# Patient Record
Sex: FEMALE | Race: BLACK OR AFRICAN AMERICAN | NOT HISPANIC OR LATINO | URBAN - METROPOLITAN AREA
[De-identification: names, ages, dates, MRNs, and addresses within clinical notes are randomized per-mention and may not be internally consistent; named-entity substitution may affect disease eponyms.]

---

## 2018-06-03 ENCOUNTER — HOSPITAL ENCOUNTER (EMERGENCY)
Facility: OTHER | Age: 43
Discharge: HOME | End: 2018-06-03
Attending: EMERGENCY MEDICINE

## 2018-06-03 VITALS
TEMPERATURE: 98 F | RESPIRATION RATE: 16 BRPM | HEIGHT: 64 IN | DIASTOLIC BLOOD PRESSURE: 68 MMHG | SYSTOLIC BLOOD PRESSURE: 119 MMHG | OXYGEN SATURATION: 97 % | WEIGHT: 150 LBS | BODY MASS INDEX: 25.61 KG/M2 | HEART RATE: 75 BPM
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
## 2018-06-03 DIAGNOSIS — S40.022A ARM CONTUSION, LEFT, INITIAL ENCOUNTER: Primary | ICD-10-CM

## 2018-06-03 PROCEDURE — 99283 EMERGENCY DEPT VISIT LOW MDM: CPT

## 2018-06-03 RX ORDER — IBUPROFEN 600 MG/1
600 TABLET ORAL EVERY 6 HOURS PRN
Qty: 20 TABLET | Refills: 0 | Status: SHIPPED | OUTPATIENT
Start: 2018-06-03
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
## 2018-06-03 NOTE — ED NOTES
Searched for pt multiple times throughout ED. Unable to locate pt. Provider. EDMOND Munugia, PAC, aware.
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
## 2020-08-13 ENCOUNTER — LAB VISIT (OUTPATIENT)
Dept: LAB | Facility: OTHER | Age: 45
End: 2020-08-13
Payer: MEDICAID

## 2020-08-13 DIAGNOSIS — Z03.818 ENCOUNTER FOR OBSERVATION FOR SUSPECTED EXPOSURE TO OTHER BIOLOGICAL AGENTS RULED OUT: ICD-10-CM

## 2020-08-13 PROCEDURE — U0003 INFECTIOUS AGENT DETECTION BY NUCLEIC ACID (DNA OR RNA); SEVERE ACUTE RESPIRATORY SYNDROME CORONAVIRUS 2 (SARS-COV-2) (CORONAVIRUS DISEASE [COVID-19]), AMPLIFIED PROBE TECHNIQUE, MAKING USE OF HIGH THROUGHPUT TECHNOLOGIES AS DESCRIBED BY CMS-2020-01-R: HCPCS

## 2020-08-16 LAB — SARS-COV-2 RNA RESP QL NAA+PROBE: NOT DETECTED

## 2024-09-12 ENCOUNTER — HOSPITAL ENCOUNTER (EMERGENCY)
Facility: OTHER | Age: 49
Discharge: HOME | End: 2024-09-12
Attending: EMERGENCY MEDICINE
Payer: MEDICAID

## 2024-09-12 VITALS
HEART RATE: 100 BPM | BODY MASS INDEX: 26.8 KG/M2 | SYSTOLIC BLOOD PRESSURE: 126 MMHG | HEIGHT: 64 IN | RESPIRATION RATE: 18 BRPM | OXYGEN SATURATION: 95 % | DIASTOLIC BLOOD PRESSURE: 74 MMHG | TEMPERATURE: 100 F | WEIGHT: 157 LBS

## 2024-09-12 DIAGNOSIS — L03.317 CELLULITIS OF BUTTOCK, RIGHT: Primary | ICD-10-CM

## 2024-09-12 PROCEDURE — 63600175 PHARM REV CODE 636 W HCPCS: Performed by: EMERGENCY MEDICINE

## 2024-09-12 PROCEDURE — 96372 THER/PROPH/DIAG INJ SC/IM: CPT | Performed by: EMERGENCY MEDICINE

## 2024-09-12 PROCEDURE — 99284 EMERGENCY DEPT VISIT MOD MDM: CPT | Mod: 25

## 2024-09-12 RX ORDER — KETOROLAC TROMETHAMINE 30 MG/ML
30 INJECTION, SOLUTION INTRAMUSCULAR; INTRAVENOUS
Status: COMPLETED | OUTPATIENT
Start: 2024-09-12 | End: 2024-09-12

## 2024-09-12 RX ORDER — SULFAMETHOXAZOLE AND TRIMETHOPRIM 800; 160 MG/1; MG/1
1 TABLET ORAL 2 TIMES DAILY
Qty: 14 TABLET | Refills: 0 | Status: SHIPPED | OUTPATIENT
Start: 2024-09-12 | End: 2024-09-19

## 2024-09-12 RX ORDER — CEPHALEXIN 500 MG/1
500 CAPSULE ORAL EVERY 8 HOURS
Qty: 21 CAPSULE | Refills: 0 | Status: SHIPPED | OUTPATIENT
Start: 2024-09-12 | End: 2024-09-19

## 2024-09-12 RX ORDER — IBUPROFEN 600 MG/1
600 TABLET ORAL EVERY 6 HOURS PRN
Qty: 20 TABLET | Refills: 0 | Status: SHIPPED | OUTPATIENT
Start: 2024-09-12

## 2024-09-12 RX ADMIN — KETOROLAC TROMETHAMINE 30 MG: 30 INJECTION, SOLUTION INTRAMUSCULAR; INTRAVENOUS at 11:09

## 2024-09-12 NOTE — ED TRIAGE NOTES
Pt reports to ED with lower back pain that she just got a pain shot for last week. States she has chronic arthritis in her back and the pain has become slightly worse since receiving the injection. Patient also reports rash to L buttock that she believes is from a spider bite. Did not see a spider. Large red area noted to L buttock. VSS

## 2024-09-12 NOTE — ED PROVIDER NOTES
"     Source of History:  The patient    Chief complaint:  Back Pain (Chronic back pain, recently had pain shot but it is not helping. ) and Abscess (Also reports redness and "spider bite" to left buttocks. )      HPI:  Faye Luna is a 48 y.o. female  who complains of a spider bite on her left buttock that she noticed a couple of days ago.  States it has gotten more painful.  Denies any fevers or chills.    She also has chronic back pain in which she sees pain management.  States she had a shot a couple of days ago but no significant improvement in her back pain.  Denies any loss of bowel or bladder.      This is the extent to the patients complaints today here in the emergency department.    ROS:   See HPI.    Review of patient's allergies indicates:  No Known Allergies    PMH:  As per HPI and below:  Past Medical History:   Diagnosis Date    Anxiety     Depression      Past Surgical History:   Procedure Laterality Date    HYSTERECTOMY         Social History     Tobacco Use    Smoking status: Every Day     Current packs/day: 1.00     Types: Cigarettes    Smokeless tobacco: Never   Substance Use Topics    Alcohol use: Yes    Drug use: Yes     Types: Marijuana       Physical Exam:    /68 (BP Location: Left arm)   Pulse 105   Temp 98.6 °F (37 °C) (Oral)   Resp 18   Ht 5' 4" (1.626 m)   Wt 71.2 kg (157 lb)   SpO2 95%   Breastfeeding No   BMI 26.95 kg/m²   Nursing note and vital signs reviewed.  Constitutional: No acute distress.  Nontoxic  Head:  Normocephalic atraumatic  Skin:  On the right buttock the patient has an area of induration with some mild erythema that is tender to palpation.  There appears to be a central pustule.  At bedside ultrasound there is no fluid collection noted.        I decided to obtain the patient's medical records.  Summary of Medical Records:        Differential Dx/ MDM/ Workup:  A 48-year-old female with local reaction versus cellulitis of the right buttock possibly " secondary to insect bite.  Based on the physical exam as well as ultrasound there is no evidence of abscess at this time.  No indication for I and D.  Will start on antibiotics and give analgesia.  I have discussed with the patient as well as their partner, who is at bedside, to use warm compresses and return precautions for symptoms of an abscess which they state they have had in the past and know what they are.    Regarding the back pain will give a shot of Toradol but did let them know that they need to follow back up with pain management if it is still bothering them.        ED Course as of 09/12/24 1120   Thu Sep 12, 2024   1118 No further workup is indicated in the emergency department today.  I updated pt regarding results and I counseled pt regarding supportive care measures.  Diagnosis and treatment plan explained to patient. I have answered all questions and the patient is satisfied with the plan of care. Patient discharged home in stable condition.  [SM]      ED Course User Index  [SM] Pablo Pelletier DO               Diagnostic Impression:    1. Cellulitis of buttock, right         ED Disposition Condition    Discharge Stable            ED Prescriptions       Medication Sig Dispense Start Date End Date Auth. Provider    ibuprofen (ADVIL,MOTRIN) 600 MG tablet Take 1 tablet (600 mg total) by mouth every 6 (six) hours as needed for Pain. 20 tablet 9/12/2024 -- Pablo Pelletier, DO    cephALEXin (KEFLEX) 500 MG capsule Take 1 capsule (500 mg total) by mouth every 8 (eight) hours. for 7 days 21 capsule 9/12/2024 9/19/2024 Pablo Pelletier, DO    sulfamethoxazole-trimethoprim 800-160mg (BACTRIM DS) 800-160 mg Tab Take 1 tablet by mouth 2 (two) times daily. for 7 days 14 tablet 9/12/2024 9/19/2024 Pablo Pelletier,           Follow-up Information    None          Pablo Pelletier,   09/12/24 1120